# Patient Record
Sex: FEMALE | Race: BLACK OR AFRICAN AMERICAN | NOT HISPANIC OR LATINO | Employment: OTHER | URBAN - METROPOLITAN AREA
[De-identification: names, ages, dates, MRNs, and addresses within clinical notes are randomized per-mention and may not be internally consistent; named-entity substitution may affect disease eponyms.]

---

## 2018-05-28 PROBLEM — I16.1 HYPERTENSIVE EMERGENCY: Status: ACTIVE | Noted: 2018-05-28

## 2024-06-24 ENCOUNTER — HOSPITAL ENCOUNTER (OUTPATIENT)
Dept: TELEMEDICINE | Facility: HOSPITAL | Age: 74
Discharge: HOME | End: 2024-06-24
Payer: MEDICAID

## 2024-06-24 DIAGNOSIS — H53.8 BLURRY VISION, BILATERAL: Primary | ICD-10-CM

## 2024-06-24 NOTE — TELEMEDICINE CONSULT
[facility]  Vascular Neurology  Comprehensive Stroke Center  TeleVascular Neurology Acute Consultation Note        Consult Information  Consults    Consulting Provider: ANIA VALDEZ   Current Providers  No providers found    Patient Location: [facility] Emergency Department    Spoke hospital nurse at bedside with patient assisting consultant.  Patient information was obtained from patient.       Stroke Documentation  Acute Stroke Times   Last Known Normal Date: 06/23/24  Last Known Normal Time: 2000  Symptom Onset Date: 06/23/24  Symptom Onset Time: 2000  Stroke Team Called Date: 06/24/24  Stroke Team Called Time: 0716  Stroke Team Arrival Date: 06/24/24  Stroke Team Arrival Time: 0723  CT Interpretation Time: 0733  Thrombolytic Therapy Recommended: No    NIH Scale:  Interval: baseline  1a. Level of Consciousness: 0-->Alert, keenly responsive  1b. LOC Questions: 0-->Answers both questions correctly  1c. LOC Commands: 0-->Performs both tasks correctly  2. Best Gaze: 0-->Normal  3. Visual: 0-->No visual loss  4. Facial Palsy: 0-->Normal symmetrical movements  5a. Motor Arm, Left: 0-->No drift, limb holds 90 (or 45) degrees for full 10 secs  5b. Motor Arm, Right: 0-->No drift, limb holds 90 (or 45) degrees for full 10 secs  6a. Motor Leg, Left: 0-->No drift, leg holds 30 degree position for full 5 secs  6b. Motor Leg, Right: 0-->No drift, leg holds 30 degree position for full 5 secs  7. Limb Ataxia: 0-->Absent  8. Sensory: 1-->Mild-to-moderate sensory loss, patient feels pinprick is less sharp or is dull on the affected side, or there is a loss of superficial pain with pinprick, but patient is aware of being touched  9. Best Language: 0-->No aphasia, normal  10. Dysarthria: 1-->Mild-to-moderate dysarthria, patient slurs at least some words and, at worst, can be understood with some difficulty  11. Extinction and Inattention  (formerly Neglect): 0-->No abnormality  Total (NIH Stroke Scale): 2      Modified Sunshine: Score: 0  Nandini Coma Scale:     ABCD2 Score:    [id number] Score:    HAS -BLED Score:    ICH Score:    Hunt & Sampson Classification:      There were no vitals taken for this visit.    Van Negative  In your opinion, this was a: Tier 1     Medical Decision Making  HPI:  73 y.o. female with a PMHx significant for stage IV lung cancer (active, undergoing chemotherapy), HTN, COPD presenting with vision changes and gait instability. LKN 2000 last night. Woke up this morning with symptoms. She states that she 'couldn't see, couldn't walk'. Blurry vision in both eyes. Felt wobbly. Concern for right arm numbness.     /54 on arrival, now 133/74.    Images personally reviewed and interpreted:  Study: Head CT and CTA Head & Neck  Study Interpretation: No acute intracranial hemorrhage. No LVO or high-grade stenosis.      Assessment and plan:  # Vision changes, gait instability, right arm numbness  - Ddx includes TIA/small ischemic stroke vs orthostasis/hypotension vs other stroke mimic.     Lytics recommendation: Thrombolytic therapy not recommended due to Outside of treatment window  and Mild Non-Disabling Symptoms  Thrombectomy recommendation: No; No large vessel occlusion identified on imaging   Placement recommendation: pending further studies     - Recommend follow-up non-urgent MRI brain without contrast to evaluate for acute ischemia.  - If above studies are abnormal, please load images to teleneurology imaging system and contact us to review.    Please contact us with any further questions or concerns or if patient has any acute neurological changes (new symptoms, worsening deficits).        ROS  Physical Exam  No past medical history on file.  No past surgical history on file.  No family history on file.    Diagnoses  Problem Noted   Blurry Vision, Bilateral 6/24/2024       Shanta Waters MD      Emergent/Acute neurological  consultation requested by spoke provider due to critical concerns for possible cerebrovascular event that could result in permanent loss of neurologic/bodily function, severe disability or death of this patient.  Immediate/timely evaluation by a highly prepared expert is paramount for optimal outcomes  High risk for neurological deterioration if not properly diagnosed  High risk for neurological deterioration if not treated promplty/as soon as possible  Complex diagnostic evaluation may be required (advanced imaging)  High risk treatment options (thrombolytics and/or thrombectomy)    Patient care was coordinated with spoke provider, including but not limted to    Discussing likely diagnosis/etiology of symptoms  Making recommendations for further diagnostic studies  Making recommendations for intravenous thrombolytics or other advanced therapies  Making recommendations for disposition (admission/transfer for higher level of care)

## 2024-06-24 NOTE — SUBJECTIVE & OBJECTIVE
HPI:  73 y.o. female with a PMHx significant for stage IV lung cancer (active, undergoing chemotherapy), HTN, COPD presenting with vision changes and gait instability. LKN 2000 last night. Woke up this morning with symptoms. She states that she 'couldn't see, couldn't walk'. Blurry vision in both eyes. Felt wobbly. Concern for right arm numbness.     /54 on arrival, now 133/74.    Images personally reviewed and interpreted:  Study: Head CT and CTA Head & Neck  Study Interpretation: No acute intracranial hemorrhage. No LVO or high-grade stenosis.      Assessment and plan:  # Vision changes, gait instability, right arm numbness  - Ddx includes TIA/small ischemic stroke vs orthostasis/hypotension vs other stroke mimic.     Lytics recommendation: Thrombolytic therapy not recommended due to Outside of treatment window  and Mild Non-Disabling Symptoms  Thrombectomy recommendation: No; No large vessel occlusion identified on imaging   Placement recommendation: pending further studies     - Recommend follow-up non-urgent MRI brain without contrast to evaluate for acute ischemia.  - If above studies are abnormal, please load images to teleneurology imaging system and contact us to review.    Please contact us with any further questions or concerns or if patient has any acute neurological changes (new symptoms, worsening deficits).